# Patient Record
Sex: MALE | Race: WHITE | NOT HISPANIC OR LATINO | Employment: FULL TIME | URBAN - NONMETROPOLITAN AREA
[De-identification: names, ages, dates, MRNs, and addresses within clinical notes are randomized per-mention and may not be internally consistent; named-entity substitution may affect disease eponyms.]

---

## 2023-03-09 DIAGNOSIS — E11.9 CONTROLLED TYPE 2 DIABETES MELLITUS WITHOUT COMPLICATION, WITHOUT LONG-TERM CURRENT USE OF INSULIN (MULTI): Primary | ICD-10-CM

## 2023-03-09 RX ORDER — GLYBURIDE 5 MG/1
5 TABLET ORAL DAILY
COMMUNITY
Start: 2021-10-12 | End: 2023-03-09 | Stop reason: SDUPTHER

## 2023-03-09 RX ORDER — GLYBURIDE 5 MG/1
5 TABLET ORAL DAILY
Qty: 30 TABLET | Refills: 2 | Status: SHIPPED | OUTPATIENT
Start: 2023-03-09 | End: 2023-06-12

## 2023-03-26 DIAGNOSIS — I10 HYPERTENSION, UNSPECIFIED TYPE: Primary | ICD-10-CM

## 2023-03-27 RX ORDER — LISINOPRIL AND HYDROCHLOROTHIAZIDE 12.5; 2 MG/1; MG/1
1 TABLET ORAL
COMMUNITY
End: 2023-05-12 | Stop reason: ALTCHOICE

## 2023-03-27 RX ORDER — LISINOPRIL AND HYDROCHLOROTHIAZIDE 12.5; 2 MG/1; MG/1
TABLET ORAL
Qty: 90 TABLET | Refills: 0 | Status: SHIPPED | OUTPATIENT
Start: 2023-03-27 | End: 2023-06-26

## 2023-03-28 DIAGNOSIS — N52.9 ERECTILE DISORDER: Primary | ICD-10-CM

## 2023-03-28 RX ORDER — TADALAFIL 20 MG/1
20 TABLET ORAL DAILY PRN
Qty: 30 TABLET | Refills: 1 | Status: SHIPPED | OUTPATIENT
Start: 2023-03-28

## 2023-03-28 RX ORDER — TADALAFIL 20 MG/1
20 TABLET ORAL
COMMUNITY
Start: 2022-05-11 | End: 2023-03-28 | Stop reason: SDUPTHER

## 2023-04-24 PROBLEM — E11.9 DIABETES MELLITUS TYPE 2, CONTROLLED, WITHOUT COMPLICATIONS (MULTI): Status: ACTIVE | Noted: 2023-04-24

## 2023-05-08 LAB
ESTIMATED AVERAGE GLUCOSE FOR HBA1C: 151 MG/DL
HEMOGLOBIN A1C/HEMOGLOBIN TOTAL IN BLOOD: 6.9 %

## 2023-05-11 PROBLEM — E78.5 HYPERLIPIDEMIA: Status: ACTIVE | Noted: 2023-05-11

## 2023-05-11 PROBLEM — R10.9 RIGHT FLANK DISCOMFORT: Status: ACTIVE | Noted: 2023-05-11

## 2023-05-11 PROBLEM — R60.9 EDEMA: Status: ACTIVE | Noted: 2023-05-11

## 2023-05-11 PROBLEM — E78.1 HYPERTRIGLYCERIDEMIA: Status: ACTIVE | Noted: 2023-05-11

## 2023-05-11 PROBLEM — G43.909 MIGRAINES: Status: ACTIVE | Noted: 2023-05-11

## 2023-05-11 PROBLEM — K21.9 GERD (GASTROESOPHAGEAL REFLUX DISEASE): Status: ACTIVE | Noted: 2023-05-11

## 2023-05-11 PROBLEM — R20.2 NUMBNESS AND TINGLING OF FOOT: Status: ACTIVE | Noted: 2023-05-11

## 2023-05-11 PROBLEM — R20.0 NUMBNESS AND TINGLING OF FOOT: Status: ACTIVE | Noted: 2023-05-11

## 2023-05-11 PROBLEM — E79.0 HYPERURICEMIA: Status: ACTIVE | Noted: 2023-05-11

## 2023-05-11 PROBLEM — M48.061 DEGENERATIVE LUMBAR SPINAL STENOSIS: Status: ACTIVE | Noted: 2023-05-11

## 2023-05-11 PROBLEM — G62.9 PERIPHERAL NEUROPATHY: Status: ACTIVE | Noted: 2023-05-11

## 2023-05-11 PROBLEM — I10 HTN (HYPERTENSION), BENIGN: Status: ACTIVE | Noted: 2023-05-11

## 2023-05-11 PROBLEM — N52.9 ERECTILE DYSFUNCTION: Status: ACTIVE | Noted: 2023-05-11

## 2023-05-12 ENCOUNTER — OFFICE VISIT (OUTPATIENT)
Dept: PRIMARY CARE | Facility: CLINIC | Age: 58
End: 2023-05-12
Payer: COMMERCIAL

## 2023-05-12 VITALS
TEMPERATURE: 96 F | BODY MASS INDEX: 39.39 KG/M2 | SYSTOLIC BLOOD PRESSURE: 122 MMHG | WEIGHT: 296.5 LBS | DIASTOLIC BLOOD PRESSURE: 76 MMHG | HEART RATE: 88 BPM | OXYGEN SATURATION: 95 %

## 2023-05-12 DIAGNOSIS — M48.061 DEGENERATIVE LUMBAR SPINAL STENOSIS: ICD-10-CM

## 2023-05-12 DIAGNOSIS — Z12.5 SCREENING PSA (PROSTATE SPECIFIC ANTIGEN): ICD-10-CM

## 2023-05-12 DIAGNOSIS — R60.9 EDEMA, UNSPECIFIED TYPE: ICD-10-CM

## 2023-05-12 DIAGNOSIS — K21.9 GASTROESOPHAGEAL REFLUX DISEASE WITHOUT ESOPHAGITIS: ICD-10-CM

## 2023-05-12 DIAGNOSIS — I10 HTN (HYPERTENSION), BENIGN: ICD-10-CM

## 2023-05-12 DIAGNOSIS — E11.9 CONTROLLED TYPE 2 DIABETES MELLITUS WITHOUT COMPLICATION, WITHOUT LONG-TERM CURRENT USE OF INSULIN (MULTI): Primary | ICD-10-CM

## 2023-05-12 DIAGNOSIS — E78.1 HYPERTRIGLYCERIDEMIA: ICD-10-CM

## 2023-05-12 DIAGNOSIS — E78.5 HYPERLIPIDEMIA, UNSPECIFIED HYPERLIPIDEMIA TYPE: ICD-10-CM

## 2023-05-12 DIAGNOSIS — E79.0 HYPERURICEMIA: ICD-10-CM

## 2023-05-12 PROBLEM — R20.0 NUMBNESS AND TINGLING OF FOOT: Status: RESOLVED | Noted: 2023-05-11 | Resolved: 2023-05-12

## 2023-05-12 PROBLEM — R10.9 RIGHT FLANK DISCOMFORT: Status: RESOLVED | Noted: 2023-05-11 | Resolved: 2023-05-12

## 2023-05-12 PROBLEM — R20.2 NUMBNESS AND TINGLING OF FOOT: Status: RESOLVED | Noted: 2023-05-11 | Resolved: 2023-05-12

## 2023-05-12 PROCEDURE — 1036F TOBACCO NON-USER: CPT | Performed by: FAMILY MEDICINE

## 2023-05-12 PROCEDURE — 3044F HG A1C LEVEL LT 7.0%: CPT | Performed by: FAMILY MEDICINE

## 2023-05-12 PROCEDURE — 99214 OFFICE O/P EST MOD 30 MIN: CPT | Performed by: FAMILY MEDICINE

## 2023-05-12 PROCEDURE — 3078F DIAST BP <80 MM HG: CPT | Performed by: FAMILY MEDICINE

## 2023-05-12 PROCEDURE — 3074F SYST BP LT 130 MM HG: CPT | Performed by: FAMILY MEDICINE

## 2023-05-12 RX ORDER — FAMOTIDINE 20 MG/1
20 TABLET, FILM COATED ORAL DAILY
COMMUNITY

## 2023-05-12 RX ORDER — ATORVASTATIN CALCIUM 20 MG/1
20 TABLET, FILM COATED ORAL DAILY
COMMUNITY
End: 2023-06-21

## 2023-05-12 ASSESSMENT — ENCOUNTER SYMPTOMS
SINUS PRESSURE: 0
FACIAL SWELLING: 0

## 2023-05-12 NOTE — PROGRESS NOTES
Subjective   Patient ID: Basilio Weinberg is a 58 y.o. male who presents for Follow-up (Go over labs.).  HPI    Follow Up Visit, past visit note reviewed.   Was able to get more info on his brother's cancer-  dxed a year ago, doing well Ca rt renal pelvis , Bladder ca   ROS  Nasal drng / congestion, right side, bloody drainage .  No st,fever,cold sxs     Patient Active Problem List   Diagnosis    Diabetes mellitus type 2, controlled, without complications (CMS/HCC)    Degenerative lumbar spinal stenosis    Erectile dysfunction    GERD (gastroesophageal reflux disease)    HTN (hypertension), benign    Hyperlipidemia    Hypertriglyceridemia    Hyperuricemia    Migraines    Peripheral neuropathy    Edema     Has not had any change in sxs in legs or back. No migraines.  No arthritis.   Edema is about the same.    Review of Systems   HENT:  Positive for congestion. Negative for ear pain, facial swelling, postnasal drip, sinus pressure and sneezing.        Objective   /76 (BP Location: Right arm, Patient Position: Sitting, BP Cuff Size: Adult)   Pulse 88   Temp 35.6 °C (96 °F) (Temporal)   Wt 134 kg (296 lb 8 oz)   SpO2 95%   BMI 39.39 kg/m²     Physical Exam  Vitals reviewed.   Constitutional:       General: He is not in acute distress.  HENT:      Nose: Congestion present.      Comments: Erythema, right septum. Recent bleed   Eyes:      Extraocular Movements: Extraocular movements intact.      Conjunctiva/sclera: Conjunctivae normal.      Pupils: Pupils are equal, round, and reactive to light.   Cardiovascular:      Rate and Rhythm: Normal rate and regular rhythm.      Heart sounds: Normal heart sounds.   Pulmonary:      Effort: Pulmonary effort is normal.      Breath sounds: No wheezing or rales.   Neurological:      General: No focal deficit present.      Mental Status: He is alert.         Assessment/Plan   Problem List Items Addressed This Visit          High    Diabetes mellitus type 2, controlled,  without complications (CMS/HCC) - Primary    Relevant Orders    Follow Up In [facility] - PCP    Hemoglobin A1C    Albumin , Urine Random    GERD (gastroesophageal reflux disease)    Relevant Orders    Follow Up In [facility] - PCP    HTN (hypertension), benign     Continue current regimen.            Relevant Orders    Follow Up In [facility] - PCP    CBC and Auto Differential    Comprehensive Metabolic Panel    Hyperlipidemia    Relevant Orders    Lipid Panel    Hypertriglyceridemia     Improvement over the last year.  Continue to stabilize. Diet controlld          Relevant Orders    Lipid Panel    Hyperuricemia     Continue current regimen.            Relevant Orders    Uric acid    Edema     Wearing knee high compression ;no change               Medium    Degenerative lumbar spinal stenosis     No change in sx freq/ intensity           Other Visit Diagnoses       Screening PSA (prostate specific antigen)        Relevant Orders    Prostate Specific Antigen, Screen            Labwork reviewed=  A1C     No change . Recommend increase exercise,  and continue healthy diet.  Pt is encouraged that a1c stable. He travelled a lot recently for work and diet not always easy to manage on the road.   Nasal drng/ congestion , likely due to dry air.   No signs/ sxs of infx or allergies   Will continue current mgmt ;  fmhx added to chart    ASHISH Vasquez MD

## 2023-06-09 DIAGNOSIS — E11.9 CONTROLLED TYPE 2 DIABETES MELLITUS WITHOUT COMPLICATION, WITHOUT LONG-TERM CURRENT USE OF INSULIN (MULTI): ICD-10-CM

## 2023-06-12 RX ORDER — GLYBURIDE 5 MG/1
TABLET ORAL
Qty: 30 TABLET | Refills: 0 | Status: SHIPPED | OUTPATIENT
Start: 2023-06-12 | End: 2023-07-03

## 2023-06-21 DIAGNOSIS — E78.5 HYPERLIPIDEMIA, UNSPECIFIED HYPERLIPIDEMIA TYPE: ICD-10-CM

## 2023-06-21 RX ORDER — ATORVASTATIN CALCIUM 20 MG/1
TABLET, FILM COATED ORAL
Qty: 90 TABLET | Refills: 0 | Status: SHIPPED | OUTPATIENT
Start: 2023-06-21 | End: 2023-09-29

## 2023-06-26 DIAGNOSIS — I10 HYPERTENSION, UNSPECIFIED TYPE: ICD-10-CM

## 2023-06-26 RX ORDER — LISINOPRIL AND HYDROCHLOROTHIAZIDE 12.5; 2 MG/1; MG/1
TABLET ORAL
Qty: 90 TABLET | Refills: 0 | Status: SHIPPED | OUTPATIENT
Start: 2023-06-26 | End: 2023-09-27

## 2023-07-03 DIAGNOSIS — E11.9 CONTROLLED TYPE 2 DIABETES MELLITUS WITHOUT COMPLICATION, WITHOUT LONG-TERM CURRENT USE OF INSULIN (MULTI): ICD-10-CM

## 2023-07-03 RX ORDER — GLYBURIDE 5 MG/1
TABLET ORAL
Qty: 30 TABLET | Refills: 5 | Status: SHIPPED | OUTPATIENT
Start: 2023-07-03 | End: 2023-12-26

## 2023-07-18 DIAGNOSIS — E11.9 CONTROLLED TYPE 2 DIABETES MELLITUS WITHOUT COMPLICATION, UNSPECIFIED WHETHER LONG TERM INSULIN USE (MULTI): ICD-10-CM

## 2023-07-18 RX ORDER — METFORMIN HYDROCHLORIDE 1000 MG/1
TABLET ORAL
Qty: 180 TABLET | Refills: 0 | Status: SHIPPED | OUTPATIENT
Start: 2023-07-18 | End: 2023-10-31

## 2023-09-12 ENCOUNTER — TELEPHONE (OUTPATIENT)
Dept: PRIMARY CARE | Facility: CLINIC | Age: 58
End: 2023-09-12
Payer: COMMERCIAL

## 2023-09-12 DIAGNOSIS — E11.9 CONTROLLED TYPE 2 DIABETES MELLITUS WITHOUT COMPLICATION, WITHOUT LONG-TERM CURRENT USE OF INSULIN (MULTI): Primary | ICD-10-CM

## 2023-09-13 RX ORDER — BLOOD SUGAR DIAGNOSTIC
STRIP MISCELLANEOUS
Qty: 100 STRIP | Refills: 1 | Status: SHIPPED | OUTPATIENT
Start: 2023-09-13

## 2023-09-27 DIAGNOSIS — I10 HYPERTENSION, UNSPECIFIED TYPE: ICD-10-CM

## 2023-09-27 RX ORDER — LISINOPRIL AND HYDROCHLOROTHIAZIDE 12.5; 2 MG/1; MG/1
TABLET ORAL
Qty: 90 TABLET | Refills: 1 | Status: SHIPPED | OUTPATIENT
Start: 2023-09-27 | End: 2024-03-21

## 2023-09-28 DIAGNOSIS — E78.5 HYPERLIPIDEMIA, UNSPECIFIED HYPERLIPIDEMIA TYPE: ICD-10-CM

## 2023-09-29 RX ORDER — ATORVASTATIN CALCIUM 20 MG/1
TABLET, FILM COATED ORAL
Qty: 90 TABLET | Refills: 3 | Status: SHIPPED | OUTPATIENT
Start: 2023-09-29

## 2023-10-28 DIAGNOSIS — E11.9 CONTROLLED TYPE 2 DIABETES MELLITUS WITHOUT COMPLICATION, UNSPECIFIED WHETHER LONG TERM INSULIN USE (MULTI): ICD-10-CM

## 2023-10-31 RX ORDER — METFORMIN HYDROCHLORIDE 1000 MG/1
TABLET ORAL
Qty: 180 TABLET | Refills: 1 | Status: SHIPPED | OUTPATIENT
Start: 2023-10-31 | End: 2024-04-15

## 2023-11-11 ENCOUNTER — LAB (OUTPATIENT)
Dept: LAB | Facility: LAB | Age: 58
End: 2023-11-11
Payer: COMMERCIAL

## 2023-11-11 DIAGNOSIS — E78.5 HYPERLIPIDEMIA, UNSPECIFIED HYPERLIPIDEMIA TYPE: ICD-10-CM

## 2023-11-11 DIAGNOSIS — E78.1 HYPERTRIGLYCERIDEMIA: ICD-10-CM

## 2023-11-11 DIAGNOSIS — E11.9 CONTROLLED TYPE 2 DIABETES MELLITUS WITHOUT COMPLICATION, WITHOUT LONG-TERM CURRENT USE OF INSULIN (MULTI): ICD-10-CM

## 2023-11-11 DIAGNOSIS — E79.0 HYPERURICEMIA: ICD-10-CM

## 2023-11-11 DIAGNOSIS — Z12.5 SCREENING PSA (PROSTATE SPECIFIC ANTIGEN): ICD-10-CM

## 2023-11-11 DIAGNOSIS — I10 HTN (HYPERTENSION), BENIGN: ICD-10-CM

## 2023-11-11 LAB
ALBUMIN SERPL BCP-MCNC: 4.2 G/DL (ref 3.4–5)
ALP SERPL-CCNC: 70 U/L (ref 33–120)
ALT SERPL W P-5'-P-CCNC: 37 U/L (ref 10–52)
ANION GAP SERPL CALC-SCNC: 13 MMOL/L (ref 10–20)
AST SERPL W P-5'-P-CCNC: 24 U/L (ref 9–39)
BILIRUB SERPL-MCNC: 0.7 MG/DL (ref 0–1.2)
BUN SERPL-MCNC: 22 MG/DL (ref 6–23)
CALCIUM SERPL-MCNC: 9.7 MG/DL (ref 8.6–10.6)
CHLORIDE SERPL-SCNC: 102 MMOL/L (ref 98–107)
CHOLEST SERPL-MCNC: 164 MG/DL (ref 0–199)
CHOLESTEROL/HDL RATIO: 5.1
CO2 SERPL-SCNC: 28 MMOL/L (ref 21–32)
CREAT SERPL-MCNC: 1.49 MG/DL (ref 0.5–1.3)
CREAT UR-MCNC: 190.5 MG/DL (ref 20–370)
GFR SERPL CREATININE-BSD FRML MDRD: 55 ML/MIN/1.73M*2
GLUCOSE SERPL-MCNC: 188 MG/DL (ref 74–99)
HDLC SERPL-MCNC: 32.1 MG/DL
LDLC SERPL CALC-MCNC: 74 MG/DL
MICROALBUMIN UR-MCNC: 11 MG/L
MICROALBUMIN/CREAT UR: 5.8 UG/MG CREAT
NON HDL CHOLESTEROL: 132 MG/DL (ref 0–149)
POTASSIUM SERPL-SCNC: 4.8 MMOL/L (ref 3.5–5.3)
PROT SERPL-MCNC: 6.8 G/DL (ref 6.4–8.2)
PSA SERPL-MCNC: 0.58 NG/ML
SODIUM SERPL-SCNC: 138 MMOL/L (ref 136–145)
TRIGL SERPL-MCNC: 290 MG/DL (ref 0–149)
URATE SERPL-MCNC: 6.7 MG/DL (ref 4–7.5)
VLDL: 58 MG/DL (ref 0–40)

## 2023-11-11 PROCEDURE — 80061 LIPID PANEL: CPT

## 2023-11-11 PROCEDURE — 84550 ASSAY OF BLOOD/URIC ACID: CPT

## 2023-11-11 PROCEDURE — 80053 COMPREHEN METABOLIC PANEL: CPT

## 2023-11-11 PROCEDURE — 84153 ASSAY OF PSA TOTAL: CPT

## 2023-11-11 PROCEDURE — 85025 COMPLETE CBC W/AUTO DIFF WBC: CPT

## 2023-11-11 PROCEDURE — 82570 ASSAY OF URINE CREATININE: CPT

## 2023-11-11 PROCEDURE — 82043 UR ALBUMIN QUANTITATIVE: CPT

## 2023-11-11 PROCEDURE — 36415 COLL VENOUS BLD VENIPUNCTURE: CPT

## 2023-11-11 PROCEDURE — 83036 HEMOGLOBIN GLYCOSYLATED A1C: CPT

## 2023-11-12 LAB
BASOPHILS # BLD AUTO: 0.04 X10*3/UL (ref 0–0.1)
BASOPHILS NFR BLD AUTO: 0.4 %
EOSINOPHIL # BLD AUTO: 0.09 X10*3/UL (ref 0–0.7)
EOSINOPHIL NFR BLD AUTO: 1 %
ERYTHROCYTE [DISTWIDTH] IN BLOOD BY AUTOMATED COUNT: 13.3 % (ref 11.5–14.5)
EST. AVERAGE GLUCOSE BLD GHB EST-MCNC: 166 MG/DL
HBA1C MFR BLD: 7.4 %
HCT VFR BLD AUTO: 42.4 % (ref 41–52)
HGB BLD-MCNC: 14.4 G/DL (ref 13.5–17.5)
IMM GRANULOCYTES # BLD AUTO: 0.02 X10*3/UL (ref 0–0.7)
IMM GRANULOCYTES NFR BLD AUTO: 0.2 % (ref 0–0.9)
LYMPHOCYTES # BLD AUTO: 1.39 X10*3/UL (ref 1.2–4.8)
LYMPHOCYTES NFR BLD AUTO: 15.3 %
MCH RBC QN AUTO: 29.1 PG (ref 26–34)
MCHC RBC AUTO-ENTMCNC: 34 G/DL (ref 32–36)
MCV RBC AUTO: 86 FL (ref 80–100)
MONOCYTES # BLD AUTO: 0.78 X10*3/UL (ref 0.1–1)
MONOCYTES NFR BLD AUTO: 8.6 %
NEUTROPHILS # BLD AUTO: 6.74 X10*3/UL (ref 1.2–7.7)
NEUTROPHILS NFR BLD AUTO: 74.5 %
NRBC BLD-RTO: 0 /100 WBCS (ref 0–0)
PLATELET # BLD AUTO: 202 X10*3/UL (ref 150–450)
RBC # BLD AUTO: 4.95 X10*6/UL (ref 4.5–5.9)
WBC # BLD AUTO: 9.1 X10*3/UL (ref 4.4–11.3)

## 2023-11-17 ENCOUNTER — OFFICE VISIT (OUTPATIENT)
Dept: PRIMARY CARE | Facility: CLINIC | Age: 58
End: 2023-11-17
Payer: COMMERCIAL

## 2023-11-17 VITALS
HEART RATE: 91 BPM | SYSTOLIC BLOOD PRESSURE: 121 MMHG | TEMPERATURE: 95.9 F | HEIGHT: 73 IN | RESPIRATION RATE: 14 BRPM | DIASTOLIC BLOOD PRESSURE: 75 MMHG | BODY MASS INDEX: 38.73 KG/M2 | WEIGHT: 292.2 LBS | OXYGEN SATURATION: 94 %

## 2023-11-17 DIAGNOSIS — K21.9 GASTROESOPHAGEAL REFLUX DISEASE WITHOUT ESOPHAGITIS: ICD-10-CM

## 2023-11-17 DIAGNOSIS — Z00.00 PHYSICAL EXAM, ANNUAL: Primary | ICD-10-CM

## 2023-11-17 DIAGNOSIS — I10 HTN (HYPERTENSION), BENIGN: ICD-10-CM

## 2023-11-17 DIAGNOSIS — E78.1 HYPERTRIGLYCERIDEMIA: ICD-10-CM

## 2023-11-17 DIAGNOSIS — E79.0 HYPERURICEMIA: ICD-10-CM

## 2023-11-17 DIAGNOSIS — E78.5 HYPERLIPIDEMIA, UNSPECIFIED HYPERLIPIDEMIA TYPE: ICD-10-CM

## 2023-11-17 DIAGNOSIS — E11.9 CONTROLLED TYPE 2 DIABETES MELLITUS WITHOUT COMPLICATION, WITHOUT LONG-TERM CURRENT USE OF INSULIN (MULTI): ICD-10-CM

## 2023-11-17 DIAGNOSIS — G43.909 MIGRAINE WITHOUT STATUS MIGRAINOSUS, NOT INTRACTABLE, UNSPECIFIED MIGRAINE TYPE: ICD-10-CM

## 2023-11-17 DIAGNOSIS — R94.4 DECREASED GLOMERULAR FILTRATION RATE (GFR): ICD-10-CM

## 2023-11-17 PROCEDURE — 3048F LDL-C <100 MG/DL: CPT | Performed by: FAMILY MEDICINE

## 2023-11-17 PROCEDURE — 3051F HG A1C>EQUAL 7.0%<8.0%: CPT | Performed by: FAMILY MEDICINE

## 2023-11-17 PROCEDURE — 1036F TOBACCO NON-USER: CPT | Performed by: FAMILY MEDICINE

## 2023-11-17 PROCEDURE — 99396 PREV VISIT EST AGE 40-64: CPT | Performed by: FAMILY MEDICINE

## 2023-11-17 PROCEDURE — 3074F SYST BP LT 130 MM HG: CPT | Performed by: FAMILY MEDICINE

## 2023-11-17 PROCEDURE — 3078F DIAST BP <80 MM HG: CPT | Performed by: FAMILY MEDICINE

## 2023-11-17 PROCEDURE — 3061F NEG MICROALBUMINURIA REV: CPT | Performed by: FAMILY MEDICINE

## 2023-11-17 ASSESSMENT — PATIENT HEALTH QUESTIONNAIRE - PHQ9
SUM OF ALL RESPONSES TO PHQ9 QUESTIONS 1 AND 2: 0
1. LITTLE INTEREST OR PLEASURE IN DOING THINGS: NOT AT ALL
2. FEELING DOWN, DEPRESSED OR HOPELESS: NOT AT ALL

## 2023-11-17 ASSESSMENT — ENCOUNTER SYMPTOMS
POLYDIPSIA: 0
POLYPHAGIA: 0

## 2023-11-17 NOTE — PROGRESS NOTES
" Subjective   Patient ID: Basilio Weinberg is a 58 y.o. male who presents for Annual Exam and Immunizations (PT declines flu vaccine ).  HPI      Pt here for wellness .  Last CPE 1 year ago .       Interval Health :    Labwork review  A1c increased   GFR low, stable. Urine nl    Interval Changes in PMHx. PSHx, FMHx : none     Concerns/Questions:   - noted A1c increasing.  Feels it is from lack of exercise and diet .       Review of Systems   Endocrine: Negative for polydipsia, polyphagia and polyuria.        Objective   /75 (BP Location: Right arm, Patient Position: Sitting, BP Cuff Size: Large adult)   Pulse 91   Temp 35.5 °C (95.9 °F)   Resp 14   Ht 1.854 m (6' 1\")   Wt 133 kg (292 lb 3.2 oz)   SpO2 94%   BMI 38.55 kg/m²     Physical Exam  Vitals reviewed.   Constitutional:       General: He is not in acute distress.  Cardiovascular:      Rate and Rhythm: Normal rate and regular rhythm.      Heart sounds: Normal heart sounds.   Pulmonary:      Effort: Pulmonary effort is normal.      Breath sounds: No wheezing or rales.   Neurological:      General: No focal deficit present.      Mental Status: He is alert.                   Assessment/Plan   Problem List Items Addressed This Visit          High    Diabetes mellitus type 2, controlled, without complications (CMS/HCC)    Relevant Orders    US renal complete    Hemoglobin A1C    GERD (gastroesophageal reflux disease)    HTN (hypertension), benign    Hyperlipidemia    Hypertriglyceridemia    Hyperuricemia       Medium    Decreased glomerular filtration rate (GFR)    Relevant Orders    US renal complete    Migraines     Other Visit Diagnoses       Physical exam, annual    -  Primary          DM2 w hyperglycemia  - recommend additional med- Actos , or  weekly injection   - needle phobia   - feels strongly he can made permanent changes .    GERD  Continue current regimen.   Lipids  - stable .    Low GFR    Check ultrasound    Follow up   6mo w labs      M. " Pedro MARROQUIN

## 2023-11-22 ENCOUNTER — ANCILLARY PROCEDURE (OUTPATIENT)
Dept: RADIOLOGY | Facility: CLINIC | Age: 58
End: 2023-11-22
Payer: COMMERCIAL

## 2023-11-22 DIAGNOSIS — E11.9 CONTROLLED TYPE 2 DIABETES MELLITUS WITHOUT COMPLICATION, WITHOUT LONG-TERM CURRENT USE OF INSULIN (MULTI): ICD-10-CM

## 2023-11-22 DIAGNOSIS — R94.4 DECREASED GLOMERULAR FILTRATION RATE (GFR): ICD-10-CM

## 2023-11-22 PROCEDURE — 76770 US EXAM ABDO BACK WALL COMP: CPT | Performed by: RADIOLOGY

## 2023-11-22 PROCEDURE — 76770 US EXAM ABDO BACK WALL COMP: CPT

## 2023-11-30 ENCOUNTER — PATIENT MESSAGE (OUTPATIENT)
Dept: PRIMARY CARE | Facility: CLINIC | Age: 58
End: 2023-11-30
Payer: COMMERCIAL

## 2023-12-26 DIAGNOSIS — E11.9 CONTROLLED TYPE 2 DIABETES MELLITUS WITHOUT COMPLICATION, WITHOUT LONG-TERM CURRENT USE OF INSULIN (MULTI): ICD-10-CM

## 2023-12-26 RX ORDER — GLYBURIDE 5 MG/1
TABLET ORAL
Qty: 30 TABLET | Refills: 5 | Status: SHIPPED | OUTPATIENT
Start: 2023-12-26

## 2024-03-21 DIAGNOSIS — I10 HYPERTENSION, UNSPECIFIED TYPE: ICD-10-CM

## 2024-03-21 RX ORDER — LISINOPRIL AND HYDROCHLOROTHIAZIDE 12.5; 2 MG/1; MG/1
TABLET ORAL
Qty: 90 TABLET | Refills: 0 | Status: SHIPPED | OUTPATIENT
Start: 2024-03-21

## 2024-04-15 DIAGNOSIS — E11.9 CONTROLLED TYPE 2 DIABETES MELLITUS WITHOUT COMPLICATION, UNSPECIFIED WHETHER LONG TERM INSULIN USE (MULTI): ICD-10-CM

## 2024-04-15 RX ORDER — METFORMIN HYDROCHLORIDE 1000 MG/1
TABLET ORAL
Qty: 180 TABLET | Refills: 1 | Status: SHIPPED | OUTPATIENT
Start: 2024-04-15

## 2024-05-11 ENCOUNTER — LAB (OUTPATIENT)
Dept: LAB | Facility: LAB | Age: 59
End: 2024-05-11
Payer: COMMERCIAL

## 2024-05-11 DIAGNOSIS — E11.9 CONTROLLED TYPE 2 DIABETES MELLITUS WITHOUT COMPLICATION, WITHOUT LONG-TERM CURRENT USE OF INSULIN (MULTI): ICD-10-CM

## 2024-05-11 PROCEDURE — 36415 COLL VENOUS BLD VENIPUNCTURE: CPT

## 2024-05-11 PROCEDURE — 83036 HEMOGLOBIN GLYCOSYLATED A1C: CPT

## 2024-05-12 LAB
EST. AVERAGE GLUCOSE BLD GHB EST-MCNC: 194 MG/DL
HBA1C MFR BLD: 8.4 %

## 2024-05-17 ENCOUNTER — OFFICE VISIT (OUTPATIENT)
Dept: PRIMARY CARE | Facility: CLINIC | Age: 59
End: 2024-05-17
Payer: COMMERCIAL

## 2024-05-17 VITALS
HEART RATE: 76 BPM | TEMPERATURE: 96.2 F | DIASTOLIC BLOOD PRESSURE: 78 MMHG | BODY MASS INDEX: 39.36 KG/M2 | OXYGEN SATURATION: 95 % | SYSTOLIC BLOOD PRESSURE: 133 MMHG | WEIGHT: 298.3 LBS

## 2024-05-17 DIAGNOSIS — E11.9 CONTROLLED TYPE 2 DIABETES MELLITUS WITHOUT COMPLICATION, WITHOUT LONG-TERM CURRENT USE OF INSULIN (MULTI): Primary | ICD-10-CM

## 2024-05-17 DIAGNOSIS — R94.4 DECREASED GLOMERULAR FILTRATION RATE (GFR): ICD-10-CM

## 2024-05-17 DIAGNOSIS — E78.5 HYPERLIPIDEMIA, UNSPECIFIED HYPERLIPIDEMIA TYPE: ICD-10-CM

## 2024-05-17 DIAGNOSIS — K21.9 GASTROESOPHAGEAL REFLUX DISEASE WITHOUT ESOPHAGITIS: ICD-10-CM

## 2024-05-17 DIAGNOSIS — H91.93 BILATERAL HEARING LOSS, UNSPECIFIED HEARING LOSS TYPE: ICD-10-CM

## 2024-05-17 DIAGNOSIS — Z12.5 SCREENING PSA (PROSTATE SPECIFIC ANTIGEN): ICD-10-CM

## 2024-05-17 PROCEDURE — 3052F HG A1C>EQUAL 8.0%<EQUAL 9.0%: CPT | Performed by: FAMILY MEDICINE

## 2024-05-17 PROCEDURE — 3075F SYST BP GE 130 - 139MM HG: CPT | Performed by: FAMILY MEDICINE

## 2024-05-17 PROCEDURE — 99213 OFFICE O/P EST LOW 20 MIN: CPT | Performed by: FAMILY MEDICINE

## 2024-05-17 PROCEDURE — 3078F DIAST BP <80 MM HG: CPT | Performed by: FAMILY MEDICINE

## 2024-05-17 RX ORDER — TIMOLOL MALEATE 5 MG/ML
SOLUTION/ DROPS OPHTHALMIC
COMMUNITY
Start: 2024-04-19

## 2024-05-17 NOTE — PATIENT INSTRUCTIONS
A1C -   in 3 months      Followup of  all labs and a CPE in  6 months     Schedule Audiology consult.    Dr. Vasquez

## 2024-05-17 NOTE — PROGRESS NOTES
Subjective   Patient ID: Basilio Weinberg is a 59 y.o. male who presents for Follow-up (-Go over labs /-Check ears).  HPI    Pt here for 6 month visit.      Interval Health :  very good  ;  changedwork; more [place] environment ; sedentary work ; leftear feels plugged ? Wax ;  always had tinnitus,this is not different .     Interval Changes in PMHx. PSHx, FMHx : denies     Concerns/Questions:    None    Discussion of labs     Review of Systems    Objective   /78 (BP Location: Right arm, Patient Position: Sitting, BP Cuff Size: Adult)   Pulse 76   Temp 35.7 °C (96.2 °F) (Temporal)   Wt 135 kg (298 lb 4.8 oz)   SpO2 95%   BMI 39.36 kg/m²     Physical Exam  Wt stable   Alert. No distress  TM : canals free from obstruction .       Assessment/Plan   Problem List Items Addressed This Visit          High    Diabetes mellitus type 2, controlled, without complications (Multi) - Primary    Relevant Orders    Hemoglobin A1C    Follow Up In [facility] - PCP    TSH with reflex to Free T4 if abnormal    Lipid Panel    Hemoglobin A1C    GERD (gastroesophageal reflux disease)    Relevant Orders    Hemoglobin A1C    Follow Up [facility] - PCP    TSH with reflex to Free T4 if abnormal    Lipid Panel    Hyperlipidemia       Medium    Decreased glomerular filtration rate (GFR)    Relevant Orders    Comprehensive Metabolic Panel     Other Visit Diagnoses       Screening PSA (prostate specific antigen)        Relevant Orders    Prostate Specific Antigen, Screen    Bilateral hearing loss, unspecified hearing loss type        Relevant Orders    Referral to Audiology        DM2 w hyperglycemia  - discussion regarding goal (<7 %)  ;  treatment ;  barriers to getting to goal.   - he plans: better diet ;  start to exercise ; continue oral medication.   - open to other medications, if / when necessary .    A1C -   in 3 months    Followup of  all labs and a CPE in  6 months     ASHISH Vasquez MD

## 2024-06-06 ENCOUNTER — CLINICAL SUPPORT (OUTPATIENT)
Dept: AUDIOLOGY | Facility: CLINIC | Age: 59
End: 2024-06-06
Payer: COMMERCIAL

## 2024-06-06 DIAGNOSIS — H90.3 SENSORINEURAL HEARING LOSS (SNHL) OF BOTH EARS: ICD-10-CM

## 2024-06-06 PROCEDURE — 92557 COMPREHENSIVE HEARING TEST: CPT | Performed by: AUDIOLOGIST

## 2024-06-06 PROCEDURE — 92550 TYMPANOMETRY & REFLEX THRESH: CPT | Performed by: AUDIOLOGIST

## 2024-06-06 NOTE — PROGRESS NOTES
COMPREHENSIVE AUDIOMETRIC EVALUATION      Name:  Basilio Weinberg  :  1965  Age:  59 y.o.  Date of Evaluation:  24   Referring Provider:  Laura Vasquez M.D.    History:  Mr. Weinberg was seen today for an evaluation of hearing.  Patient reported intermittent tinnitus, bilaterally, family concerns for hearing loss occurring gradually over time, intermittent noise exposure with hearing protection, and medical hx significant for diabetes.  When asked, patient denied otalgia, aural fullness, dizziness, and treatment by chemotherapy or radiation    See audiometric evaluation at end of this report or scanned under media tab    OTOSCOPY:       Right Ear: Clear canal       Left Ear: Clear canal    226 Hz TYMPANOMETRY:       Right Ear: Type A: normal peak pressure, compliance, and ear canal volume, consistent with middle ear function within normal limits       Left Ear: Type A: normal peak pressure, compliance, and ear canal volume, consistent with middle ear function within normal limits    AUDIOMETRIC EVALUATION (Phones): Normal through 2000 Hz sloping to moderately severe, sensorineural hearing loss, binaurally    NOTE: High presbycusis configuration is typically consistent with people above age 60 though can also be present in patient with systemic etiologies such as diabetes.    Test technique:  Standard Audiometry  Reliability:   good    SPEECH RECOGNITION THRESHOLD:       Right Ear:  5 dBHL in good agreement with PTA       Left Ear:  10 dBHL in good agreement with PTA    WORD RECOGNITION:       Right Ear:  excellent (90%) at normal presentation level       Left Ear:  excellent (90%) at normal presentation level    IPSILATERAL ACOUSTIC REFLEXES:       Right Ear:  WNL from 500- 2000 Hz, consistent with patient's hearing sensitivity       Left Ear:    WNL from 500- 2000 Hz, consistent with patient's hearing sensitivity    DISCUSSION:   Discussed results and recommendations with patient.  Questions were  addressed and the patient was encouraged to contact our department should concerns arise.    RECOMMENDATIONS:  -Recommend patient return should concerns for changes in hearing sensitivity arise or as medically indicated.   -Recommend patient return for hearing aid evaluation dependent on motivation and patient perception of debilitation from hearing loss  -Discussed possible contributors of tinnitus and management    Ruben Lorenzo, CCC-A     Appt: 3:30 - 4:00 PM

## 2024-06-17 DIAGNOSIS — I10 HYPERTENSION, UNSPECIFIED TYPE: ICD-10-CM

## 2024-06-17 RX ORDER — LISINOPRIL AND HYDROCHLOROTHIAZIDE 12.5; 2 MG/1; MG/1
TABLET ORAL
Qty: 90 TABLET | Refills: 0 | Status: SHIPPED | OUTPATIENT
Start: 2024-06-17

## 2024-07-03 DIAGNOSIS — E11.9 CONTROLLED TYPE 2 DIABETES MELLITUS WITHOUT COMPLICATION, WITHOUT LONG-TERM CURRENT USE OF INSULIN (MULTI): ICD-10-CM

## 2024-07-03 RX ORDER — GLYBURIDE 5 MG/1
TABLET ORAL
Qty: 30 TABLET | Refills: 3 | Status: SHIPPED | OUTPATIENT
Start: 2024-07-03

## 2024-09-11 DIAGNOSIS — I10 HYPERTENSION, UNSPECIFIED TYPE: ICD-10-CM

## 2024-09-11 RX ORDER — LISINOPRIL AND HYDROCHLOROTHIAZIDE 12.5; 2 MG/1; MG/1
1 TABLET ORAL DAILY
Qty: 90 TABLET | Refills: 1 | Status: SHIPPED | OUTPATIENT
Start: 2024-09-11 | End: 2025-09-11

## 2024-09-13 DIAGNOSIS — N52.9 ERECTILE DISORDER: ICD-10-CM

## 2024-09-13 RX ORDER — TADALAFIL 20 MG/1
20 TABLET ORAL DAILY PRN
Qty: 30 TABLET | Refills: 0 | Status: SHIPPED | OUTPATIENT
Start: 2024-09-13

## 2024-10-09 DIAGNOSIS — E78.5 HYPERLIPIDEMIA, UNSPECIFIED HYPERLIPIDEMIA TYPE: ICD-10-CM

## 2024-10-10 DIAGNOSIS — E11.9 CONTROLLED TYPE 2 DIABETES MELLITUS WITHOUT COMPLICATION, UNSPECIFIED WHETHER LONG TERM INSULIN USE (MULTI): ICD-10-CM

## 2024-10-10 RX ORDER — ATORVASTATIN CALCIUM 20 MG/1
TABLET, FILM COATED ORAL
Qty: 90 TABLET | Refills: 0 | Status: SHIPPED | OUTPATIENT
Start: 2024-10-10

## 2024-10-10 RX ORDER — METFORMIN HYDROCHLORIDE 1000 MG/1
1000 TABLET ORAL EVERY 12 HOURS
Qty: 180 TABLET | Refills: 0 | Status: SHIPPED | OUTPATIENT
Start: 2024-10-10

## 2024-10-28 DIAGNOSIS — E11.9 CONTROLLED TYPE 2 DIABETES MELLITUS WITHOUT COMPLICATION, WITHOUT LONG-TERM CURRENT USE OF INSULIN (MULTI): ICD-10-CM

## 2024-10-28 RX ORDER — GLYBURIDE 5 MG/1
5 TABLET ORAL DAILY
Qty: 90 TABLET | Refills: 1 | Status: SHIPPED | OUTPATIENT
Start: 2024-10-28 | End: 2025-10-28

## 2024-11-18 ENCOUNTER — LAB (OUTPATIENT)
Dept: LAB | Facility: LAB | Age: 59
End: 2024-11-18
Payer: COMMERCIAL

## 2024-11-18 DIAGNOSIS — K21.9 GASTROESOPHAGEAL REFLUX DISEASE WITHOUT ESOPHAGITIS: ICD-10-CM

## 2024-11-18 DIAGNOSIS — R94.4 DECREASED GLOMERULAR FILTRATION RATE (GFR): ICD-10-CM

## 2024-11-18 DIAGNOSIS — E11.9 CONTROLLED TYPE 2 DIABETES MELLITUS WITHOUT COMPLICATION, WITHOUT LONG-TERM CURRENT USE OF INSULIN (MULTI): ICD-10-CM

## 2024-11-18 DIAGNOSIS — Z12.5 SCREENING PSA (PROSTATE SPECIFIC ANTIGEN): ICD-10-CM

## 2024-11-18 LAB
ALBUMIN SERPL BCP-MCNC: 4.3 G/DL (ref 3.4–5)
ALP SERPL-CCNC: 66 U/L (ref 33–120)
ALT SERPL W P-5'-P-CCNC: 25 U/L (ref 10–52)
ANION GAP SERPL CALC-SCNC: 13 MMOL/L (ref 10–20)
AST SERPL W P-5'-P-CCNC: 22 U/L (ref 9–39)
BILIRUB SERPL-MCNC: 0.9 MG/DL (ref 0–1.2)
BUN SERPL-MCNC: 16 MG/DL (ref 6–23)
CALCIUM SERPL-MCNC: 9.6 MG/DL (ref 8.6–10.6)
CHLORIDE SERPL-SCNC: 101 MMOL/L (ref 98–107)
CHOLEST SERPL-MCNC: 152 MG/DL (ref 0–199)
CHOLESTEROL/HDL RATIO: 4.9
CO2 SERPL-SCNC: 29 MMOL/L (ref 21–32)
CREAT SERPL-MCNC: 1.39 MG/DL (ref 0.5–1.3)
EGFRCR SERPLBLD CKD-EPI 2021: 59 ML/MIN/1.73M*2
EST. AVERAGE GLUCOSE BLD GHB EST-MCNC: 148 MG/DL
GLUCOSE SERPL-MCNC: 165 MG/DL (ref 74–99)
HBA1C MFR BLD: 6.8 %
HDLC SERPL-MCNC: 31.3 MG/DL
LDLC SERPL CALC-MCNC: 80 MG/DL
NON HDL CHOLESTEROL: 121 MG/DL (ref 0–149)
POTASSIUM SERPL-SCNC: 4.8 MMOL/L (ref 3.5–5.3)
PROT SERPL-MCNC: 6.8 G/DL (ref 6.4–8.2)
PSA SERPL-MCNC: 0.59 NG/ML
SODIUM SERPL-SCNC: 138 MMOL/L (ref 136–145)
TRIGL SERPL-MCNC: 202 MG/DL (ref 0–149)
TSH SERPL-ACNC: 2.25 MIU/L (ref 0.44–3.98)
VLDL: 40 MG/DL (ref 0–40)

## 2024-11-18 PROCEDURE — 84443 ASSAY THYROID STIM HORMONE: CPT

## 2024-11-18 PROCEDURE — 80061 LIPID PANEL: CPT

## 2024-11-18 PROCEDURE — 36415 COLL VENOUS BLD VENIPUNCTURE: CPT

## 2024-11-18 PROCEDURE — G0103 PSA SCREENING: HCPCS

## 2024-11-18 PROCEDURE — 83036 HEMOGLOBIN GLYCOSYLATED A1C: CPT

## 2024-11-18 PROCEDURE — 80053 COMPREHEN METABOLIC PANEL: CPT

## 2024-11-22 ENCOUNTER — APPOINTMENT (OUTPATIENT)
Dept: PRIMARY CARE | Facility: CLINIC | Age: 59
End: 2024-11-22
Payer: COMMERCIAL

## 2024-11-22 VITALS
TEMPERATURE: 97.4 F | SYSTOLIC BLOOD PRESSURE: 118 MMHG | HEIGHT: 72 IN | BODY MASS INDEX: 38.25 KG/M2 | OXYGEN SATURATION: 94 % | WEIGHT: 282.4 LBS | HEART RATE: 82 BPM | RESPIRATION RATE: 14 BRPM | DIASTOLIC BLOOD PRESSURE: 80 MMHG

## 2024-11-22 DIAGNOSIS — I10 HTN (HYPERTENSION), BENIGN: ICD-10-CM

## 2024-11-22 DIAGNOSIS — L91.8 SKIN TAG, ACQUIRED: ICD-10-CM

## 2024-11-22 DIAGNOSIS — K21.9 GASTROESOPHAGEAL REFLUX DISEASE WITHOUT ESOPHAGITIS: ICD-10-CM

## 2024-11-22 DIAGNOSIS — R94.4 DECREASED GLOMERULAR FILTRATION RATE (GFR): ICD-10-CM

## 2024-11-22 DIAGNOSIS — Z00.00 PHYSICAL EXAM, ANNUAL: Primary | ICD-10-CM

## 2024-11-22 DIAGNOSIS — E78.5 HYPERLIPIDEMIA, UNSPECIFIED HYPERLIPIDEMIA TYPE: ICD-10-CM

## 2024-11-22 DIAGNOSIS — E11.9 CONTROLLED TYPE 2 DIABETES MELLITUS WITHOUT COMPLICATION, WITHOUT LONG-TERM CURRENT USE OF INSULIN (MULTI): ICD-10-CM

## 2024-11-22 PROCEDURE — 82043 UR ALBUMIN QUANTITATIVE: CPT

## 2024-11-22 PROCEDURE — 3008F BODY MASS INDEX DOCD: CPT | Performed by: FAMILY MEDICINE

## 2024-11-22 PROCEDURE — 99396 PREV VISIT EST AGE 40-64: CPT | Performed by: FAMILY MEDICINE

## 2024-11-22 PROCEDURE — 3074F SYST BP LT 130 MM HG: CPT | Performed by: FAMILY MEDICINE

## 2024-11-22 PROCEDURE — 82570 ASSAY OF URINE CREATININE: CPT

## 2024-11-22 PROCEDURE — 3048F LDL-C <100 MG/DL: CPT | Performed by: FAMILY MEDICINE

## 2024-11-22 PROCEDURE — 3044F HG A1C LEVEL LT 7.0%: CPT | Performed by: FAMILY MEDICINE

## 2024-11-22 PROCEDURE — 3079F DIAST BP 80-89 MM HG: CPT | Performed by: FAMILY MEDICINE

## 2024-11-22 PROCEDURE — 1036F TOBACCO NON-USER: CPT | Performed by: FAMILY MEDICINE

## 2024-11-22 ASSESSMENT — ENCOUNTER SYMPTOMS
SHORTNESS OF BREATH: 0
ADENOPATHY: 0
FATIGUE: 0
PALPITATIONS: 0
BRUISES/BLEEDS EASILY: 0
DYSURIA: 0
UNEXPECTED WEIGHT CHANGE: 0
POLYPHAGIA: 0
HEADACHES: 0
FEVER: 0
LIGHT-HEADEDNESS: 0
HEMATURIA: 0
COUGH: 0

## 2024-11-22 NOTE — PROGRESS NOTES
Subjective   Patient ID: Basilio Weinberg is a 59 y.o. male who presents for Annual Exam (CPE - pt states he wants to see if he needs referral to specialist for moles, skin tags./Pt declines flu vaccine.) and Follow-up (Follow up visit for DM, GERD, HL).    HPI  Pt here for wellness .  Last CPE 1 year ago .       DM2 / GERD / Decreased Hearing / Decreased GFR  (59) / Migraines / Lumbar degenerative changes     Interval Health :  good ; eating better,  eating less;  lost wt in interval.   Denies new med problems / history .     Interval Changes in PMHx. PSHx, FMHx :  denies       Concerns/Questions:    Chief Complaint   Patient presents with    Annual Exam     CPE - pt states he wants to see if he needs referral to specialist for moles, skin tags.  Pt declines flu vaccine.    Follow-up     Follow up visit for DM, GERD, HL         Chief Complaint   Patient presents with    Annual Exam     CPE - pt states he wants to see if he needs referral to specialist for moles, skin tags.  Pt declines flu vaccine.    Follow-up     Follow up visit for DM, GERD, HL     WT LOSS through eating less .  Wouldlike to losemore   Activity - not able, neuropathy in toes and pain in back .     Migraine - rare    Review of Systems   Constitutional:  Negative for fatigue, fever and unexpected weight change.   HENT:  Negative for dental problem.    Eyes:  Negative for visual disturbance.   Respiratory:  Negative for cough and shortness of breath.    Cardiovascular:  Negative for chest pain and palpitations.   Endocrine: Negative for polyphagia and polyuria.   Genitourinary:  Negative for dysuria and hematuria.   Skin:  Negative for rash.   Neurological:  Negative for syncope, light-headedness and headaches.   Hematological:  Negative for adenopathy. Does not bruise/bleed easily.       Objective   /80 (BP Location: Right arm, Patient Position: Sitting, BP Cuff Size: Large adult)   Pulse 82   Temp 36.3 °C (97.4 °F) (Temporal)   Resp 14  "  Ht 1.835 m (6' 0.25\")   Wt 128 kg (282 lb 6.4 oz)   SpO2 94%   BMI 38.04 kg/m²     Physical Exam  Vitals and nursing note reviewed.   Constitutional:       General: He is not in acute distress.     Appearance: Normal appearance.   Cardiovascular:      Heart sounds: Normal heart sounds.   Pulmonary:      Breath sounds: Normal breath sounds.   Abdominal:      General: Bowel sounds are normal.      Palpations: Abdomen is soft.   Musculoskeletal:         General: Normal range of motion.      Cervical back: Neck supple.   Neurological:      General: No focal deficit present.      Mental Status: He is alert and oriented to person, place, and time.         Assessment/Plan   Problem List Items Addressed This Visit          High    Diabetes mellitus type 2, controlled, without complications (Multi)    Relevant Orders    Hemoglobin A1C    Follow Up In [facility] - PCP    Albumin-Creatinine Ratio, Urine Random    GERD (gastroesophageal reflux disease)     Other Visit Diagnoses       Physical exam, annual    -  Primary    Skin tag, acquired        Relevant Orders    Referral to Dermatology          Wellness  - preventive care and health maintenance reviewed and discussed   CRC screen 2021, due 2031 ([facility])   PSA nl     DM2   Lab Results   Component Value Date    HGBA1C 6.8 (H) 11/18/2024     LDL 80  On Statin and aCE- I    Due urine alb / order placed     Flu vacc today .   Encouraged more exercise -  water exercises , stationary biking may be do able with his back and neuropathy sxs.       Skin tags/ mole - recommend  Derm c/s     ASHISH Vasquez MD  "

## 2024-11-23 LAB
CREAT UR-MCNC: 247.5 MG/DL (ref 20–370)
MICROALBUMIN UR-MCNC: <7 MG/L
MICROALBUMIN/CREAT UR: NORMAL MG/G{CREAT}

## 2025-01-03 DIAGNOSIS — E78.5 HYPERLIPIDEMIA, UNSPECIFIED HYPERLIPIDEMIA TYPE: ICD-10-CM

## 2025-01-06 RX ORDER — ATORVASTATIN CALCIUM 20 MG/1
TABLET, FILM COATED ORAL
Qty: 90 TABLET | Refills: 1 | Status: SHIPPED | OUTPATIENT
Start: 2025-01-06

## 2025-02-26 DIAGNOSIS — I10 HYPERTENSION, UNSPECIFIED TYPE: ICD-10-CM

## 2025-02-27 RX ORDER — LISINOPRIL AND HYDROCHLOROTHIAZIDE 12.5; 2 MG/1; MG/1
1 TABLET ORAL DAILY
Qty: 90 TABLET | Refills: 0 | Status: SHIPPED | OUTPATIENT
Start: 2025-02-27 | End: 2026-02-27

## 2025-03-26 ENCOUNTER — APPOINTMENT (OUTPATIENT)
Dept: DERMATOLOGY | Facility: CLINIC | Age: 60
End: 2025-03-26
Payer: COMMERCIAL

## 2025-03-26 DIAGNOSIS — L81.4 LENTIGO: ICD-10-CM

## 2025-03-26 DIAGNOSIS — Z12.83 ENCOUNTER FOR SCREENING FOR MALIGNANT NEOPLASM OF SKIN: Primary | ICD-10-CM

## 2025-03-26 DIAGNOSIS — D18.01 HEMANGIOMA OF SKIN: ICD-10-CM

## 2025-03-26 DIAGNOSIS — Z12.83 SKIN CANCER SCREENING: ICD-10-CM

## 2025-03-26 DIAGNOSIS — L82.1 SEBORRHEIC KERATOSIS: ICD-10-CM

## 2025-03-26 PROCEDURE — 99203 OFFICE O/P NEW LOW 30 MIN: CPT | Performed by: NURSE PRACTITIONER

## 2025-03-26 PROCEDURE — 1036F TOBACCO NON-USER: CPT | Performed by: NURSE PRACTITIONER

## 2025-03-26 NOTE — PROGRESS NOTES
Subjective     Basilio Weinberg is a 60 y.o. male who presents for the following: Skin Check (Pt presents to office for waist up skin exam.  Last skin exam over 15 years ago.  Pt has family history of NMSC in Aunt.  Pt has scattered lesions of concern at this time. Chaperone offered and declined.//).     Review of Systems:  No other skin or systemic complaints other than what is documented elsewhere in the note.    The following portions of the chart were reviewed this encounter and updated as appropriate:  Tobacco  Allergies  Meds  Problems  Med Hx  Surg Hx  Fam Hx       Skin Cancer History  No skin cancer on file.    Specialty Problems    None    Past Medical History:  Basilio Weinberg  has a past medical history of Brachial neuritis or radiculitis (11/19/2012), Conjunctival hemorrhage, left eye (12/07/2017), Personal history of other endocrine, nutritional and metabolic disease, and Plantar fasciitis (11/16/2010).    Past Surgical History:  Basilio Weinberg  has a past surgical history that includes Vasectomy (11/27/2017).    Family History:  Patient family history includes Brain cancer in his father; Glaucoma in an other family member; Kidney cancer in his brother; Ovary Cancer in his mother; bladder cancer in his brother.    Social History:  Basilio Weinberg  reports that he has never smoked. He has never used smokeless tobacco. He reports current alcohol use. He reports that he does not use drugs.    Allergies:  Sulfa (sulfonamide antibiotics)    Current Medications / CAM's:    Current Outpatient Medications:     atorvastatin (Lipitor) 20 mg tablet, TAKE 1 TABLET BY MOUTH ONCE DAILY AT BEDTIME, Disp: 90 tablet, Rfl: 1    blood sugar diagnostic (OneTouch Ultra Test) strip, Use to Check blood sugar twice a day, Disp: 100 strip, Rfl: 1    famotidine (Pepcid) 20 mg tablet, Take 1 tablet (20 mg) by mouth once daily., Disp: , Rfl:     glyBURIDE (Diabeta) 5 mg tablet, Take 1 tablet (5 mg) by mouth once  daily., Disp: 90 tablet, Rfl: 1    lisinopriL-hydrochlorothiazide 20-12.5 mg tablet, Take 1 tablet by mouth once daily, Disp: 90 tablet, Rfl: 0    metFORMIN (Glucophage) 1,000 mg tablet, TAKE 1 TABLET BY MOUTH EVERY 12 HOURS, Disp: 180 tablet, Rfl: 1    tadalafil 20 mg tablet, TAKE 1 TABLET BY MOUTH ONCE DAILY AS NEEDED FOR ERECTILE DYSFUNCTION, Disp: 30 tablet, Rfl: 0    timolol (Timoptic) 0.5 % ophthalmic solution, INSTILL 1 DROP INTO EACH EYE ONCE DAILY, Disp: , Rfl:      Objective   Well appearing patient in no apparent distress; mood and affect are within normal limits.    A waist up examination was performed. All findings within normal limits unless otherwise noted below.    Assessment/Plan   1. Encounter for screening for malignant neoplasm of skin  Scattered benign lesions    - Protective measures, such as avoiding skin exposure to sunlight during peak sun hours (10 AM to 3 PM), wearing protective clothing, and applying high-SPF sunscreen, are essential for reducing exposure to harmful ultraviolet (UV) light.  - Monthly self-examination of the skin is helpful to detect new lesions or changes in existing lesions.  - Discussed signs and symptoms of sun-related skin cancers.   - Make sure your moles are not signs of skin cancer (melanoma). Remember the ABCDEs of melanoma lesions:  A - Asymmetry: One half of the lesion does not mirror the other half.  B - Border: The borders are irregular or vague (indistinct).  C - Color: More than one color may be noted within the mole.  D - Diameter: Size greater than 6 mm (roughly the size of a pencil eraser) may be concerning.  E - Evolving: Notable changes in the lesion over time are suspicious signs for skin cancer.    2. Skin cancer screening    Related Procedures  Follow Up In Dermatology - Established Patient    3. Seborrheic keratosis  Stuck on verrucous, tan-brown papules and plaques.      Although Seborrheic Keratoses can be troublesome and unsightly, they are  entirely benign.  Removal of Seborrheic Keratoses is considered a cosmetic procedure. Removal is typically performed using liquid nitrogen cryotherapy.  Treatment of current lesions does not prevent the development of new Seborrheic Keratoses in the future.    4. Hemangioma of skin  Violaceous/red papule with maroon lagoons     - A cherry hemangioma is a small macule (small, flat, smooth area) or papule (small, solid bump) formed from an overgrowth of tiny blood vessels in the skin. Cherry hemangiomas are characteristically red or purplish in color. They often first appear in middle adulthood and usually increase in number with age. Cherry hemangiomas are noncancerous (benign) and are common in adults.  - Lesions are benign, reassured patient.     5. Lentigo  Scattered tan macules in sun-exposed areas.    A solar lentigo (plural, solar lentigines), sometimes called an age spot or liver spot, is a brown macule (small, flat, smooth area of skin) caused by chronic sun or artificial ultraviolet (UV) light exposure. There may be just one lentigo or there may be multiple. This type of lentigo is different from lentigo simplex (discussed separately) because it is caused by exposure to UV light. Solar lentigines are benign, but they do indicate excessive sun exposure, a risk factor for the development of skin cancer.  Lesions are benign, no treatment needed.     Follow up in 12 months for a total body skin exam. Please call me if there are any changes or development of concerning symptoms (lesion/skin condition is changing, bleeding, enlarging, or worsening).

## 2025-04-22 DIAGNOSIS — E11.9 CONTROLLED TYPE 2 DIABETES MELLITUS WITHOUT COMPLICATION, WITHOUT LONG-TERM CURRENT USE OF INSULIN: ICD-10-CM

## 2025-04-22 RX ORDER — GLYBURIDE 5 MG/1
5 TABLET ORAL DAILY
Qty: 90 TABLET | Refills: 0 | Status: SHIPPED | OUTPATIENT
Start: 2025-04-22

## 2025-05-21 LAB
EST. AVERAGE GLUCOSE BLD GHB EST-MCNC: 200 MG/DL
EST. AVERAGE GLUCOSE BLD GHB EST-SCNC: 11.1 MMOL/L
HBA1C MFR BLD: 8.6 %

## 2025-05-22 ENCOUNTER — APPOINTMENT (OUTPATIENT)
Dept: PRIMARY CARE | Facility: CLINIC | Age: 60
End: 2025-05-22
Payer: COMMERCIAL

## 2025-05-22 VITALS
TEMPERATURE: 97.6 F | BODY MASS INDEX: 39.21 KG/M2 | HEART RATE: 70 BPM | RESPIRATION RATE: 16 BRPM | DIASTOLIC BLOOD PRESSURE: 81 MMHG | SYSTOLIC BLOOD PRESSURE: 132 MMHG | WEIGHT: 291.1 LBS | OXYGEN SATURATION: 94 %

## 2025-05-22 DIAGNOSIS — E11.40 CONTROLLED TYPE 2 DIABETES MELLITUS WITH DIABETIC NEUROPATHY, WITHOUT LONG-TERM CURRENT USE OF INSULIN: Primary | ICD-10-CM

## 2025-05-22 DIAGNOSIS — I10 HYPERTENSION, UNSPECIFIED TYPE: ICD-10-CM

## 2025-05-22 DIAGNOSIS — E78.5 HYPERLIPIDEMIA, UNSPECIFIED HYPERLIPIDEMIA TYPE: ICD-10-CM

## 2025-05-22 DIAGNOSIS — G60.9 IDIOPATHIC PERIPHERAL NEUROPATHY: ICD-10-CM

## 2025-05-22 PROCEDURE — 3075F SYST BP GE 130 - 139MM HG: CPT | Performed by: FAMILY MEDICINE

## 2025-05-22 PROCEDURE — 3079F DIAST BP 80-89 MM HG: CPT | Performed by: FAMILY MEDICINE

## 2025-05-22 PROCEDURE — 99214 OFFICE O/P EST MOD 30 MIN: CPT | Performed by: FAMILY MEDICINE

## 2025-05-22 PROCEDURE — 1036F TOBACCO NON-USER: CPT | Performed by: FAMILY MEDICINE

## 2025-05-22 RX ORDER — LISINOPRIL AND HYDROCHLOROTHIAZIDE 12.5; 2 MG/1; MG/1
1 TABLET ORAL DAILY
Qty: 90 TABLET | Refills: 1 | Status: SHIPPED | OUTPATIENT
Start: 2025-05-22 | End: 2025-11-18

## 2025-05-22 RX ORDER — ATORVASTATIN CALCIUM 20 MG/1
20 TABLET, FILM COATED ORAL NIGHTLY
Qty: 90 TABLET | Refills: 1 | Status: SHIPPED | OUTPATIENT
Start: 2025-05-22

## 2025-05-22 RX ORDER — BLOOD SUGAR DIAGNOSTIC
STRIP MISCELLANEOUS
Qty: 200 STRIP | Refills: 3 | Status: SHIPPED | OUTPATIENT
Start: 2025-05-22

## 2025-05-29 ENCOUNTER — APPOINTMENT (OUTPATIENT)
Dept: PHARMACY | Facility: HOSPITAL | Age: 60
End: 2025-05-29
Payer: COMMERCIAL

## 2025-05-29 DIAGNOSIS — E11.40 CONTROLLED TYPE 2 DIABETES MELLITUS WITH DIABETIC NEUROPATHY, WITHOUT LONG-TERM CURRENT USE OF INSULIN: ICD-10-CM

## 2025-05-29 DIAGNOSIS — E11.9 CONTROLLED TYPE 2 DIABETES MELLITUS WITHOUT COMPLICATION, WITHOUT LONG-TERM CURRENT USE OF INSULIN: Primary | ICD-10-CM

## 2025-05-29 RX ORDER — TIRZEPATIDE 2.5 MG/.5ML
2.5 INJECTION, SOLUTION SUBCUTANEOUS WEEKLY
Qty: 2 ML | Refills: 0 | Status: SHIPPED | OUTPATIENT
Start: 2025-05-29

## 2025-05-29 NOTE — ASSESSMENT & PLAN NOTE
Patient has been referred to the clinical pharmacy team for diabetes management. His diabetes is uncontrolled with an A1c of 8.6% (goal <7%). The patient is currently on Glyburide 5 mg and Metformin 2000 mg. He reports no side effects and is tolerating this regimen well. He reports home BG of 247, 287, and 267. Reviewed with the patient FBG goal of  and post prandial <180. Discussed with the patient initiating Mounjaro 2.5 mg for better glycemic control and the patient was agreeable.     Mounjaro Education:     Counseled patient on Mounjaro MOA, expectations, side effects, duration of therapy, administration, and monitoring parameters.  Provided detailed dosing and administration counseling to ensure proper technique.   Reviewed Mounjaro titration schedule, starting with 2.5 mg once weekly to a goal of 15 mg once weekly if tolerated  Counseled patient on the benefits of GLP-1ra glycemic control and weight loss  Reviewed storage requirements of Mounjaro when not in use, and when to administer the medication if a dose is missed.  Advised patient that they may experience improved satiety after meals and portion sizes of meals may be reduced as doses of Mounjaro increase.    PLAN   START   Mounjaro 2.5 mg once weekly. Prescription sent to patient's preferred pharmacy.   CONTINUE   Glyburide 5 mg once daily  Metformin 2000 mg once daily

## 2025-05-29 NOTE — PROGRESS NOTES
Clinical Pharmacy Appointment    Patient ID: Basilio Weinberg is a 60 y.o. male who presents for diabetes management.    Pt is here for First appointment.     Referring Provider: Laura Vasquez MD  PCP: Laura Vasquez MD  Last visit with PCP: 5/22/2025   Next visit with PCP: 11/26/2025    Subjective   Medication Reconciliation:  Changed: none  Added: none  Discontinued: none    Drug Interactions  No relevant drug interactions were noted.    Medication System Management  [facility] [place] [place]  Adherence/Organization: none  Affordability/Accessibility: none      DIABETES MELLITUS TYPE 2:    Known diabetic complications: Neuropathy, Proteinuria.  Does patient follow with Endocrinology: No  Last optometry exam: November/December 2024  Most recent visit in Podiatry: never, has an upcoming appointment on 7/8/2025-- patient denies sores or cuts on feet today      Current diabetic medications include:  Glyburide 5 mg once daily  Takes 1/2 tablet twice daily before meals  Experienced hypoglycemia when taking 1 tablet in the morning  Metformin 2000 mg once daily    Clarifications to above regimen: none  Adverse Effects: none    Past diabetic medications include:      Glucose Readings:  Glucometer/CGM Type: Glucometer  Patient tests BG 1 times per day    Current home BG readings (mg/dL): 247, 287, 267  Previous home BG readings (mg/dL): N/A    Any episodes of hypoglycemia? No, since  medication.  Did patient treat episode of hypoglycemia appropriately? N/A  Does the patient have a prescription for ready-to-use Glucagon? Not on insulin    Lifestyle:  Diet: 3 meals/day.   BK: [employer] breakfast sandwich  LN: leftovers, sandwiches  DN: typically eats out   Snacks: Wheat thins, grapes, pop tarts, chips  Drinks: Coffee, water, Coke, Unsweetened iced tea  Exercise:   Currently does not exercise   Plans to start cycling   Tobacco history: none    Secondary Prevention:  Statin?  Yes  ACE-I/ARB? Yes  Aspirin? No    Pertinent PMH Review:  PMH of Pancreatitis: No  PMH of Retinopathy: No  PMH of Urinary Tract Infections: No  PMH of MTC: No  PMH of MEN2: No  UACR/EGFR in last year?: Yes  Albumin/Creatinine Ratio   Date Value Ref Range Status   11/22/2024   Final     Comment:     One or more analytes used in this calculation is outside of the analytical measurement range. Calculation cannot be performed.   11/11/2023 5.8 <30.0 ug/mg Creat Final     Albumin/Creatine Ratio   Date Value Ref Range Status   05/11/2022 9.7 0.0 - 30.0 ug/mg crt Final         Objective   Allergies[1]  Social History     Social History Narrative    Not on file      Medication Review  Current Outpatient Medications   Medication Instructions    atorvastatin (LIPITOR) 20 mg, oral, Nightly    blood sugar diagnostic (OneTouch Ultra Test) Use to Check blood sugar twice a day    famotidine (PEPCID) 20 mg, Daily    glyBURIDE (DIABETA) 5 mg, oral, Daily    lisinopriL-hydrochlorothiazide 20-12.5 mg tablet 1 tablet, oral, Daily    metFORMIN (GLUCOPHAGE) 1,000 mg, oral, Every 12 hours    Mounjaro 2.5 mg, subcutaneous, Weekly    tadalafil 20 mg, oral, Daily PRN    timolol (Timoptic) 0.5 % ophthalmic solution INSTILL 1 DROP INTO EACH EYE ONCE DAILY      Vitals  BP Readings from Last 2 Encounters:   05/22/25 132/81   11/22/24 118/80     BMI Readings from Last 1 Encounters:   05/22/25 39.21 kg/m²      Labs  A1C  Lab Results   Component Value Date    HGBA1C 8.6 (H) 05/20/2025    HGBA1C 6.8 (H) 11/18/2024    HGBA1C 8.4 (H) 05/11/2024     BMP  Lab Results   Component Value Date    CALCIUM 9.6 11/18/2024     11/18/2024    K 4.8 11/18/2024    CO2 29 11/18/2024     11/18/2024    BUN 16 11/18/2024    CREATININE 1.39 (H) 11/18/2024    EGFR 59 (L) 11/18/2024     LFTs  Lab Results   Component Value Date    ALT 25 11/18/2024    AST 22 11/18/2024    ALKPHOS 66 11/18/2024    BILITOT 0.9 11/18/2024     FLP  Lab Results   Component Value  Date    TRIG 202 (H) 11/18/2024    CHOL 152 11/18/2024    LDLF 88 02/04/2023    LDLCALC 80 11/18/2024    HDL 31.3 11/18/2024     Urine Microalbumin  Lab Results   Component Value Date    MICROALBCREA  11/22/2024      Comment:      One or more analytes used in this calculation is outside of the analytical measurement range. Calculation cannot be performed.     Weight Management  Wt Readings from Last 3 Encounters:   05/22/25 132 kg (291 lb 1.6 oz)   11/22/24 128 kg (282 lb 6.4 oz)   05/17/24 135 kg (298 lb 4.8 oz)      There is no height or weight on file to calculate BMI.     Assessment/Plan   Problem List Items Addressed This Visit       Diabetes mellitus type 2, controlled, without complications - Primary    Patient has been referred to the clinical pharmacy team for diabetes management. His diabetes is uncontrolled with an A1c of 8.6% (goal <7%). The patient is currently on Glyburide 5 mg and Metformin 2000 mg. He reports no side effects and is tolerating this regimen well. He reports home BG of 247, 287, and 267. Reviewed with the patient FBG goal of  and post prandial <180. Discussed with the patient initiating Mounjaro 2.5 mg for better glycemic control and the patient was agreeable.     Mounjaro Education:     Counseled patient on Mounjaro MOA, expectations, side effects, duration of therapy, administration, and monitoring parameters.  Provided detailed dosing and administration counseling to ensure proper technique.   Reviewed Mounjaro titration schedule, starting with 2.5 mg once weekly to a goal of 15 mg once weekly if tolerated  Counseled patient on the benefits of GLP-1ra glycemic control and weight loss  Reviewed storage requirements of Mounjaro when not in use, and when to administer the medication if a dose is missed.  Advised patient that they may experience improved satiety after meals and portion sizes of meals may be reduced as doses of Mounjaro increase.    PLAN   START   Mounjaro 2.5 mg  once weekly. Prescription sent to patient's preferred pharmacy.   CONTINUE   Glyburide 5 mg once daily  Metformin 2000 mg once daily               Relevant Medications    tirzepatide (Mounjaro) 2.5 mg/0.5 mL pen injector     Other Visit Diagnoses         Controlled type 2 diabetes mellitus with diabetic neuropathy, without long-term current use of insulin        Relevant Medications    tirzepatide (Mounjaro) 2.5 mg/0.5 mL pen injector    Other Relevant Orders    Referral to Clinical Pharmacy            Clinical Pharmacist follow-up: 3 weeks, Telehealth visit  Patient is not followed in CCM. (If yes, track minutes under compass noa at each visit)    Continue all meds under the continuation of care with the referring provider and clinical pharmacy team.    Thank you,  Ashley Babb, PharmD.   PGY-1 Pharmacy Resident   [phone]    Verbal consent to manage patient's drug therapy was obtained from the patient. They were informed they may decline to participate or withdraw from participation in pharmacy services at any time.         [1]   Allergies  Allergen Reactions    Sulfa (Sulfonamide Antibiotics) Rash

## 2025-06-18 NOTE — PROGRESS NOTES
Clinical Pharmacy Appointment    Patient ID: Basilio Weinberg is a 60 y.o. male who presents for diabetes management.    Pt is here for Follow Up appointment.     Referring Provider: Laura Vasquez MD  PCP: Laura Vasquez MD  Last visit with PCP: 5/22/2025   Next visit with PCP: 11/26/2025    Subjective   Medication Reconciliation:  Changed: none  Added: none  Discontinued: none    Drug Interactions  No relevant drug interactions were noted.    Medication System Management  [facility] [place], [place]  Adherence/Organization: none  Affordability/Accessibility: none      DIABETES MELLITUS TYPE 2:    Known diabetic complications: Neuropathy, Proteinuria.  Does patient follow with Endocrinology: No  Last optometry exam: November/December 2024  Most recent visit in Podiatry: never, has an upcoming appointment on 7/8/2025-- patient denies sores or cuts on feet today      Current diabetic medications include:  Glyburide 5 mg once daily  Takes 1/2 tablet twice daily before meals  Experienced hypoglycemia when taking 1 tablet in the morning  Metformin 2000 mg once daily  Mounjaro 2.5 mg once daily     Clarifications to above regimen: none  Adverse Effects: none    Past diabetic medications include:  none    Glucose Readings:  Glucometer/CGM Type: Glucometer  Patient tests BG 1 times per day    Current home BG readings (mg/dL): 218, 183, 211, 218, 172, 186, 225  Previous home BG readings (mg/dL): 247, 287, 267    Any episodes of hypoglycemia? No, since  medication.  Did patient treat episode of hypoglycemia appropriately? N/A  Does the patient have a prescription for ready-to-use Glucagon? Not on insulin    Lifestyle:  Diet: 3 meals/day.   BK: [employer] breakfast sandwich  LN: leftovers, sandwiches  DN: typically eats out   Snacks: Wheat thins, grapes, pop tarts, chips  Drinks: Coffee, water, Coke, Unsweetened iced tea  Exercise:   Currently does not exercise   Plans to start cycling    Tobacco history: none    Secondary Prevention:  Statin? Yes  ACE-I/ARB? Yes  Aspirin? No    Pertinent PMH Review:  PMH of Pancreatitis: No  PMH of Retinopathy: No  PMH of Urinary Tract Infections: No  PMH of MTC: No  PMH of MEN2: No  UACR/EGFR in last year?: Yes  Albumin/Creatinine Ratio   Date Value Ref Range Status   11/22/2024   Final     Comment:     One or more analytes used in this calculation is outside of the analytical measurement range. Calculation cannot be performed.   11/11/2023 5.8 <30.0 ug/mg Creat Final     Albumin/Creatine Ratio   Date Value Ref Range Status   05/11/2022 9.7 0.0 - 30.0 ug/mg crt Final         Objective   Allergies[1]  Social History     Social History Narrative    Not on file      Medication Review  Current Outpatient Medications   Medication Instructions    atorvastatin (LIPITOR) 20 mg, oral, Nightly    blood sugar diagnostic (OneTouch Ultra Test) Use to Check blood sugar twice a day    famotidine (PEPCID) 20 mg, Daily    glyBURIDE (DIABETA) 5 mg, oral, Daily    lisinopriL-hydrochlorothiazide 20-12.5 mg tablet 1 tablet, oral, Daily    metFORMIN (GLUCOPHAGE) 1,000 mg, oral, Every 12 hours    Mounjaro 2.5 mg, subcutaneous, Weekly    tadalafil 20 mg, oral, Daily PRN    timolol (Timoptic) 0.5 % ophthalmic solution INSTILL 1 DROP INTO EACH EYE ONCE DAILY      Vitals  BP Readings from Last 2 Encounters:   05/22/25 132/81   11/22/24 118/80     BMI Readings from Last 1 Encounters:   05/22/25 39.21 kg/m²      Labs  A1C  Lab Results   Component Value Date    HGBA1C 8.6 (H) 05/20/2025    HGBA1C 6.8 (H) 11/18/2024    HGBA1C 8.4 (H) 05/11/2024     BMP  Lab Results   Component Value Date    CALCIUM 9.6 11/18/2024     11/18/2024    K 4.8 11/18/2024    CO2 29 11/18/2024     11/18/2024    BUN 16 11/18/2024    CREATININE 1.39 (H) 11/18/2024    EGFR 59 (L) 11/18/2024     LFTs  Lab Results   Component Value Date    ALT 25 11/18/2024    AST 22 11/18/2024    ALKPHOS 66 11/18/2024     BILITOT 0.9 11/18/2024     FLP  Lab Results   Component Value Date    TRIG 202 (H) 11/18/2024    CHOL 152 11/18/2024    LDLF 88 02/04/2023    LDLCALC 80 11/18/2024    HDL 31.3 11/18/2024     Urine Microalbumin  Lab Results   Component Value Date    MICROALBCREA  11/22/2024      Comment:      One or more analytes used in this calculation is outside of the analytical measurement range. Calculation cannot be performed.     Weight Management  Wt Readings from Last 3 Encounters:   05/22/25 132 kg (291 lb 1.6 oz)   11/22/24 128 kg (282 lb 6.4 oz)   05/17/24 135 kg (298 lb 4.8 oz)      There is no height or weight on file to calculate BMI.     Assessment/Plan   Problem List Items Addressed This Visit       Diabetes mellitus type 2, controlled, without complications - Primary    Patient has been referred to the clinical pharmacy team for diabetes management. His diabetes is uncontrolled with an A1c of 8.6% (goal <7%). The patient is currently on Glyburide 5 mg, Metformin 2000 mg, and Mounjaro 2.5 mg. He reports no side effects and is tolerating this regimen well. Patient reports home BG avg. 201 which has improved since last visit. Discussed increasing Mounjaro dose as he is tolerating it well and needs better glycemic control. Patient was agreeable.     PLAN   INCREASE to   Mounjaro 5 mg once weekly. Prescription sent to patient's preferred pharmacy.   CONTINUE   Glyburide 5 mg once daily  Metformin 2000 mg once daily                Other Visit Diagnoses         Controlled type 2 diabetes mellitus with diabetic neuropathy, without long-term current use of insulin        Relevant Orders    Referral to Clinical Pharmacy              Clinical Pharmacist follow-up: 4 weeks, Telehealth visit  Patient is not followed in CCM. (If yes, track minutes under compass noa at each visit)    Continue all meds under the continuation of care with the referring provider and clinical pharmacy team.    Thank you,  Ashley Bbab, MatheusD.    PGY-1 Pharmacy Resident   [phone]    Verbal consent to manage patient's drug therapy was obtained from the patient. They were informed they may decline to participate or withdraw from participation in pharmacy services at any time.         [1]   Allergies  Allergen Reactions    Sulfa (Sulfonamide Antibiotics) Rash

## 2025-06-19 ENCOUNTER — APPOINTMENT (OUTPATIENT)
Dept: PHARMACY | Facility: HOSPITAL | Age: 60
End: 2025-06-19
Payer: COMMERCIAL

## 2025-06-19 DIAGNOSIS — E11.9 CONTROLLED TYPE 2 DIABETES MELLITUS WITHOUT COMPLICATION, WITHOUT LONG-TERM CURRENT USE OF INSULIN: Primary | ICD-10-CM

## 2025-06-19 DIAGNOSIS — E11.40 CONTROLLED TYPE 2 DIABETES MELLITUS WITH DIABETIC NEUROPATHY, WITHOUT LONG-TERM CURRENT USE OF INSULIN: ICD-10-CM

## 2025-06-19 RX ORDER — TIRZEPATIDE 5 MG/.5ML
5 INJECTION, SOLUTION SUBCUTANEOUS
Qty: 2 ML | Refills: 0 | Status: SHIPPED | OUTPATIENT
Start: 2025-06-19

## 2025-06-19 NOTE — ASSESSMENT & PLAN NOTE
Patient has been referred to the clinical pharmacy team for diabetes management. His diabetes is uncontrolled with an A1c of 8.6% (goal <7%). The patient is currently on Glyburide 5 mg, Metformin 2000 mg, and Mounjaro 2.5 mg. He reports no side effects and is tolerating this regimen well. Patient reports home BG avg. 201 which has improved since last visit. Discussed increasing Mounjaro dose as he is tolerating it well and needs better glycemic control. Patient was agreeable.     PLAN   INCREASE to   Mounjaro 5 mg once weekly. Prescription sent to patient's preferred pharmacy.   CONTINUE   Glyburide 5 mg once daily  Metformin 2000 mg once daily

## 2025-06-21 DIAGNOSIS — N52.9 ERECTILE DISORDER: ICD-10-CM

## 2025-06-24 RX ORDER — TADALAFIL 20 MG/1
20 TABLET ORAL DAILY PRN
Qty: 30 TABLET | Refills: 0 | Status: SHIPPED | OUTPATIENT
Start: 2025-06-24

## 2025-07-08 ENCOUNTER — APPOINTMENT (OUTPATIENT)
Dept: PODIATRY | Facility: CLINIC | Age: 60
End: 2025-07-08
Payer: COMMERCIAL

## 2025-07-08 DIAGNOSIS — E11.65 POORLY CONTROLLED DIABETES MELLITUS (MULTI): Primary | ICD-10-CM

## 2025-07-08 DIAGNOSIS — G60.9 IDIOPATHIC PERIPHERAL NEUROPATHY: ICD-10-CM

## 2025-07-08 DIAGNOSIS — M79.672 LEFT FOOT PAIN: ICD-10-CM

## 2025-07-08 DIAGNOSIS — M79.671 RIGHT FOOT PAIN: ICD-10-CM

## 2025-07-08 DIAGNOSIS — E11.9 DIABETES MELLITUS TREATED WITH INJECTIONS OF NON-INSULIN MEDICATION: ICD-10-CM

## 2025-07-08 DIAGNOSIS — Z79.85 DIABETES MELLITUS TREATED WITH INJECTIONS OF NON-INSULIN MEDICATION: ICD-10-CM

## 2025-07-08 PROCEDURE — 1036F TOBACCO NON-USER: CPT | Performed by: PODIATRIST

## 2025-07-08 PROCEDURE — 99202 OFFICE O/P NEW SF 15 MIN: CPT | Performed by: PODIATRIST

## 2025-07-08 NOTE — PROGRESS NOTES
CC: initial dm foot exam    HPI: Pt presents for dm foot exam, has some numbness to the toes in the am, better as the day progresses. Last A1C was 8.6. On munjaro.      PCP: Dr. Vasquez   Last visit: 5-22-25     PMH  Medical History[1]  MEDS  Current Medications[2]  Allergies  Allergies[3]  Social History[4]  Family History[5]  Surgical History[6]    REVIEW OF SYSTEMS  DERM:   + as noted in HPI.       Physical examination:   On General Observation: Patient is a pleasant, cooperative, well developed 60 y.o. diabetic   adult male. The patient is alert and oriented to time, place and person. Patient has normal affect and mood.  There were no vitals taken for this visit.    Vascular:  DP and PT pulses are 2/4 b/l.  no edema noted. no varicosities b/l.  CFT  5 seconds to all digits bilateral.  Skin temperature is warm to warm from proximal to distal bilateral.      Muscular: Strength is 5/5 for all instrinsic and extrinsic muscle groups.     Neuro:  Proprioception present.   Sensation to vibration is  present. Protective sensation intact  at all pedal sites via Hubertus Monroe 5.07 monofilament bilateral.  Light touch present bilateral.     Derm:    No ulcers or macerations are present  Hair growth is decreased b/l le    ASSESSMENT:    E11.65, on munjaro  Pain feet    PLAN:   Consult  A comprehensive history and physical examination were preformed. DM foot care and DM foot manifestations were reviewed.  The patient was educated on clinical findings, diagnosis and treatment plans. Patient understands all that has been explained and all questions were answered to apparent satisfaction.   -Some early neuropathy is present, working to lower the sugars.  -DM foot education given to pt.  -Reviewed medication options for the neuropathy, topical capscacin.  -Follow up 6 months.    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    Conjunctival hemorrhage,  left eye 12/07/2017    Non-traumatic subconjunctival hemorrhage of left eye    Diabetes mellitus (Multi)     GERD (gastroesophageal reflux disease)     HL (hearing loss)     Hypertension     Personal history of other endocrine, nutritional and metabolic disease     History of high cholesterol    Plantar fasciitis 11/16/2010   [2]   Current Outpatient Medications:     atorvastatin (Lipitor) 20 mg tablet, Take 1 tablet (20 mg) by mouth once daily at bedtime., Disp: 90 tablet, Rfl: 1    famotidine (Pepcid) 20 mg tablet, Take 1 tablet (20 mg) by mouth once daily., Disp: , Rfl:     glyBURIDE (Diabeta) 5 mg tablet, Take 1 tablet by mouth once daily, Disp: 90 tablet, Rfl: 0    lisinopriL-hydrochlorothiazide 20-12.5 mg tablet, Take 1 tablet by mouth once daily., Disp: 90 tablet, Rfl: 1    metFORMIN (Glucophage) 1,000 mg tablet, TAKE 1 TABLET BY MOUTH EVERY 12 HOURS, Disp: 180 tablet, Rfl: 1    tadalafil 20 mg tablet, TAKE 1 TABLET BY MOUTH ONCE DAILY AS NEEDED FOR ERECTILE DYSFUNCTION, Disp: 30 tablet, Rfl: 0    timolol (Timoptic) 0.5 % ophthalmic solution, INSTILL 1 DROP INTO EACH EYE ONCE DAILY, Disp: , Rfl:     tirzepatide (Mounjaro) 5 mg/0.5 mL pen injector, Inject 5 mg under the skin every 7 days., Disp: 2 mL, Rfl: 0    blood sugar diagnostic (OneTouch Ultra Test), Use to Check blood sugar twice a day, Disp: 200 strip, Rfl: 3  [3]   Allergies  Allergen Reactions    Sulfa (Sulfonamide Antibiotics) Rash   [4]   Social History  Socioeconomic History    Marital status:    Tobacco Use    Smoking status: Never    Smokeless tobacco: Never   Vaping Use    Vaping status: Never Used   Substance and Sexual Activity    Alcohol use: Yes     Comment: Occasional use    Drug use: Never    Sexual activity: Yes     Partners: Female     Birth control/protection: Male Sterilization   [5]   Family History  Problem Relation Name Age of Onset    Other (Ovary Cancer) Mother Susie Weinberg     Cancer Mother Susie HUGO Reddyfer 59     Ovarian cancer Mother Susie Weinberg     Brain cancer Father Dave Weinberg     Cancer Father Dave Weinberg 61    Other (bladder cancer) Brother Ulysses APARICIO Sultana     Kidney cancer Brother Ulysses APARICIO Sultana     Cancer Brother Ulysses APARICIO Sultana 60 - 69    Glaucoma Other Grandfather     Cancer Maternal Grandfather Ulysses Murillo    [6]   Past Surgical History:  Procedure Laterality Date    VASECTOMY  11/27/2017    Surgery Vas Deferens Vasectomy

## 2025-07-09 DIAGNOSIS — E11.40 CONTROLLED TYPE 2 DIABETES MELLITUS WITH DIABETIC NEUROPATHY, WITHOUT LONG-TERM CURRENT USE OF INSULIN: ICD-10-CM

## 2025-07-10 ENCOUNTER — TELEPHONE (OUTPATIENT)
Dept: PRIMARY CARE | Facility: CLINIC | Age: 60
End: 2025-07-10
Payer: COMMERCIAL

## 2025-07-10 RX ORDER — BLOOD SUGAR DIAGNOSTIC
STRIP MISCELLANEOUS
Qty: 200 STRIP | Refills: 3 | Status: SHIPPED | OUTPATIENT
Start: 2025-07-10

## 2025-07-10 RX ORDER — LANCETS
EACH MISCELLANEOUS
COMMUNITY

## 2025-07-10 NOTE — TELEPHONE ENCOUNTER
Patient states he has not checked his sugars in a long time and is requesting an rx for lancets. They haven't been prescribed by you yet so he's not sure how many times a day he is supposed to check it.

## 2025-07-16 NOTE — PROGRESS NOTES
Clinical Pharmacy Appointment    Patient ID: Basilio Weinberg is a 60 y.o. male who presents for diabetes management.    Pt is here for Follow Up appointment.     Referring Provider: Laura Vasquez MD  PCP: Laura Vasquez MD  Last visit with PCP: 5/22/2025   Next visit with PCP: 11/26/2025    Subjective   Drug Interactions  No relevant drug interactions were noted.    Medication System Management  Patient's preferred pharmacy: [facility] [place], [place]  Adherence/Organization: none  Affordability/Accessibility: none      DIABETES MELLITUS TYPE 2:    Known diabetic complications: Neuropathy, Proteinuria.  Does patient follow with Endocrinology: No  Last optometry exam: November/December 2024  Most recent visit in Podiatry: never, has an upcoming appointment on 7/8/2025 ( put on topical capsaicin for neuropathy)-- follow up in 6 months    Current diabetic medications include:  Glyburide 5 mg once daily  Metformin 2000 mg once daily  Mounjaro 5 mg once weekly    Clarifications to above regimen: none  Adverse Effects: none    Past diabetic medications include:  none    Glucose Readings:  Glucometer/CGM Type: Glucometer  Patient tests BG 1 times per day    Current home BG readings (mg/dL):180s  Previous home BG readings (mg/dL): 247, 287, 267    Any episodes of hypoglycemia? No, since  medication.  Did patient treat episode of hypoglycemia appropriately? N/A  Does the patient have a prescription for ready-to-use Glucagon? Not on insulin    Lifestyle:  Diet: 3 meals/day.   Lunch and dinner:Salad with low fat dressing  Breakfast: egg mc muffin  Exercise:   Currently does not exercise   Plans to start cycling   Tobacco history: none    Secondary Prevention:  Statin? Yes, Atorvastatin 20 mg  ACE-I/ARB? Yes, Lisinopril-Hydrochlorothiazide 20-12.5 mg  Aspirin? No    Pertinent PMH Review:  PMH of Pancreatitis: No  PMH of Retinopathy: No  PMH of Urinary Tract Infections: No  PMH of MTC: No  PMH of MEN2:  No  UACR/EGFR in last year?: Yes  Albumin/Creatinine Ratio   Date Value Ref Range Status   11/22/2024   Final     Comment:     One or more analytes used in this calculation is outside of the analytical measurement range. Calculation cannot be performed.   11/11/2023 5.8 <30.0 ug/mg Creat Final     Albumin/Creatine Ratio   Date Value Ref Range Status   05/11/2022 9.7 0.0 - 30.0 ug/mg crt Final         Objective   Allergies[1]  Social History     Social History Narrative    Not on file      Medication Review  Current Outpatient Medications   Medication Instructions    atorvastatin (LIPITOR) 20 mg, oral, Nightly    blood sugar diagnostic (OneTouch Ultra Test) Use to Check blood sugar twice a day    famotidine (PEPCID) 20 mg, Daily    lancets misc No dose, route, or frequency recorded.    lisinopriL-hydrochlorothiazide 20-12.5 mg tablet 1 tablet, oral, Daily    metFORMIN (GLUCOPHAGE) 1,000 mg, oral, Every 12 hours    Mounjaro 7.5 mg, subcutaneous, Weekly    tadalafil 20 mg, oral, Daily PRN    timolol (Timoptic) 0.5 % ophthalmic solution INSTILL 1 DROP INTO EACH EYE ONCE DAILY      Vitals  BP Readings from Last 2 Encounters:   05/22/25 132/81   11/22/24 118/80     BMI Readings from Last 1 Encounters:   05/22/25 39.21 kg/m²      Labs  A1C  Lab Results   Component Value Date    HGBA1C 8.6 (H) 05/20/2025    HGBA1C 6.8 (H) 11/18/2024    HGBA1C 8.4 (H) 05/11/2024     BMP  Lab Results   Component Value Date    CALCIUM 9.6 11/18/2024     11/18/2024    K 4.8 11/18/2024    CO2 29 11/18/2024     11/18/2024    BUN 16 11/18/2024    CREATININE 1.39 (H) 11/18/2024    EGFR 59 (L) 11/18/2024     LFTs  Lab Results   Component Value Date    ALT 25 11/18/2024    AST 22 11/18/2024    ALKPHOS 66 11/18/2024    BILITOT 0.9 11/18/2024     FLP  Lab Results   Component Value Date    TRIG 202 (H) 11/18/2024    CHOL 152 11/18/2024    LDLF 88 02/04/2023    LDLCALC 80 11/18/2024    HDL 31.3 11/18/2024     Urine Microalbumin  Lab Results    Component Value Date    MICROALBCREA  11/22/2024      Comment:      One or more analytes used in this calculation is outside of the analytical measurement range. Calculation cannot be performed.     Weight Management  Wt Readings from Last 3 Encounters:   05/22/25 132 kg (291 lb 1.6 oz)   11/22/24 128 kg (282 lb 6.4 oz)   05/17/24 135 kg (298 lb 4.8 oz)      There is no height or weight on file to calculate BMI.     Assessment/Plan   Problem List Items Addressed This Visit       Diabetes mellitus type 2, controlled, without complications - Primary    Patient's goal A1c is < 7.0%.  Is pt at goal? No, 8.6  Rationale for plan: Patient has been referred to the clinical pharmacy team for diabetes management. The patient is currently on Glyburide 5 mg, Metformin 2000 mg, and Mounjaro 5 mg.Patient stated he had shakiness with the glyburide and does not want to take it anymore. Patient has had no side effects with the Mounjaro and has lost a total of 5 lbs. Discussed  with patient that we can increase Mounjaro for better glycemic control.  Patient agrees  Medication Changes:  INCREASE to   Mounjaro 7.5 mg once weekly. Prescription sent to patient's preferred pharmacy.   CONTINUE   Metformin 2000 mg once daily  Discontinue  Glyburide 5 mg once daily    Future Considerations:  Titrate the dose of the Mounjaro as tolerated               Relevant Medications    tirzepatide (Mounjaro) 7.5 mg/0.5 mL pen injector    Other Relevant Orders    Referral to Clinical Pharmacy           Clinical Pharmacist follow-up: 4 weeks, Telehealth visit  Patient is not followed in [facility]. (If yes, track minutes under compass noa at each visit)    Continue all meds under the continuation of care with the referring provider and clinical pharmacy team.    Thank you,  Jenniffer JarvisD  PGY-1 Pharmacy Resident   [phone]    Verbal consent to manage patient's drug therapy was obtained from the patient. They were informed they may decline to  participate or withdraw from participation in pharmacy services at any time.         [1]   Allergies  Allergen Reactions    Sulfa (Sulfonamide Antibiotics) Rash

## 2025-07-16 NOTE — ASSESSMENT & PLAN NOTE
Patient's goal A1c is < 7.0%.  Is pt at goal? No, 8.6  Rationale for plan: Patient has been referred to the clinical pharmacy team for diabetes management. The patient is currently on Glyburide 5 mg, Metformin 2000 mg, and Mounjaro 5 mg.Patient stated he had shakiness with the glyburide and does not want to take it anymore. Patient has had no side effects with the Mounjaro and has lost a total of 5 lbs. Discussed  with patient that we can increase Mounjaro for better glycemic control.  Patient agrees  Medication Changes:  INCREASE to   Mounjaro 7.5 mg once weekly. Prescription sent to patient's preferred pharmacy.   CONTINUE   Metformin 2000 mg once daily  Discontinue  Glyburide 5 mg once daily    Future Considerations:  Titrate the dose of the Mounjaro as tolerated

## 2025-07-17 ENCOUNTER — APPOINTMENT (OUTPATIENT)
Dept: PHARMACY | Facility: HOSPITAL | Age: 60
End: 2025-07-17
Payer: COMMERCIAL

## 2025-07-17 ENCOUNTER — TELEPHONE (OUTPATIENT)
Dept: PRIMARY CARE | Facility: CLINIC | Age: 60
End: 2025-07-17

## 2025-07-17 DIAGNOSIS — E11.9 CONTROLLED TYPE 2 DIABETES MELLITUS WITHOUT COMPLICATION, WITHOUT LONG-TERM CURRENT USE OF INSULIN: Primary | ICD-10-CM

## 2025-07-17 RX ORDER — TIRZEPATIDE 7.5 MG/.5ML
7.5 INJECTION, SOLUTION SUBCUTANEOUS WEEKLY
Qty: 2 ML | Refills: 0 | Status: SHIPPED | OUTPATIENT
Start: 2025-07-17 | End: 2025-08-14

## 2025-07-17 NOTE — TELEPHONE ENCOUNTER
Patient called in said that he is not able to afford the new RX for Mounjaro.  It went 75.00 at .05 mg to now being 1025.00 for the 7.5mg this is a monthly payment. He is looking for some help with this.

## 2025-07-18 DIAGNOSIS — E11.9 CONTROLLED TYPE 2 DIABETES MELLITUS WITHOUT COMPLICATION, UNSPECIFIED WHETHER LONG TERM INSULIN USE: ICD-10-CM

## 2025-07-18 RX ORDER — METFORMIN HYDROCHLORIDE 1000 MG/1
1000 TABLET ORAL EVERY 12 HOURS
Qty: 180 TABLET | Refills: 0 | Status: SHIPPED | OUTPATIENT
Start: 2025-07-18

## 2025-07-29 ENCOUNTER — TELEPHONE (OUTPATIENT)
Dept: PRIMARY CARE | Facility: CLINIC | Age: 60
End: 2025-07-29
Payer: COMMERCIAL

## 2025-07-29 DIAGNOSIS — E11.9 CONTROLLED TYPE 2 DIABETES MELLITUS WITHOUT COMPLICATION, WITHOUT LONG-TERM CURRENT USE OF INSULIN: Primary | ICD-10-CM

## 2025-07-29 RX ORDER — LANCETS 33 GAUGE
EACH MISCELLANEOUS
Qty: 100 EACH | Refills: 3 | Status: SHIPPED | OUTPATIENT
Start: 2025-07-29

## 2025-07-29 NOTE — TELEPHONE ENCOUNTER
Pt called in asking if his script for lances can be sent over for blood sugar sent to pharmacy soon as possible

## 2025-08-21 ENCOUNTER — APPOINTMENT (OUTPATIENT)
Dept: PHARMACY | Facility: HOSPITAL | Age: 60
End: 2025-08-21
Payer: COMMERCIAL

## 2025-08-21 DIAGNOSIS — E11.9 CONTROLLED TYPE 2 DIABETES MELLITUS WITHOUT COMPLICATION, WITHOUT LONG-TERM CURRENT USE OF INSULIN: ICD-10-CM

## 2025-08-21 RX ORDER — TIRZEPATIDE 7.5 MG/.5ML
7.5 INJECTION, SOLUTION SUBCUTANEOUS WEEKLY
Qty: 2 ML | Refills: 0 | Status: SHIPPED | OUTPATIENT
Start: 2025-08-21 | End: 2025-09-18

## 2025-09-18 ENCOUNTER — APPOINTMENT (OUTPATIENT)
Dept: PHARMACY | Facility: HOSPITAL | Age: 60
End: 2025-09-18
Payer: COMMERCIAL

## 2025-11-26 ENCOUNTER — APPOINTMENT (OUTPATIENT)
Dept: PRIMARY CARE | Facility: CLINIC | Age: 60
End: 2025-11-26
Payer: COMMERCIAL

## 2026-01-13 ENCOUNTER — APPOINTMENT (OUTPATIENT)
Dept: PODIATRY | Facility: CLINIC | Age: 61
End: 2026-01-13
Payer: COMMERCIAL